# Patient Record
Sex: MALE | URBAN - METROPOLITAN AREA
[De-identification: names, ages, dates, MRNs, and addresses within clinical notes are randomized per-mention and may not be internally consistent; named-entity substitution may affect disease eponyms.]

---

## 2019-06-12 ENCOUNTER — TELEPHONE (OUTPATIENT)
Dept: TELEMETRY | Facility: MEDICAL CENTER | Age: 74
End: 2019-06-12

## 2019-06-12 DIAGNOSIS — Z00.6 RESEARCH STUDY PATIENT: ICD-10-CM

## 2019-06-12 NOTE — TELEPHONE ENCOUNTER
[facility] [place] [facility] Calcium CT Trial:  Mr. Jaffe contacted via telephone by Harley Cage, study team member for enrollment in the above stated trial. Informed consent form reviewed, questions answered.  Consent signed via [employer] by Mr. Jaffe on 6/7/2019 and witnessed by Harley Cage on 6/10/2019.  Order for CT scan placed in [facility].

## 2019-06-21 ENCOUNTER — APPOINTMENT (OUTPATIENT)
Dept: RADIOLOGY | Facility: MEDICAL CENTER | Age: 74
End: 2019-06-21
Attending: INTERNAL MEDICINE

## 2019-06-21 ENCOUNTER — HOSPITAL ENCOUNTER (OUTPATIENT)
Dept: RADIOLOGY | Facility: MEDICAL CENTER | Age: 74
End: 2019-06-21
Attending: INTERNAL MEDICINE

## 2019-06-21 DIAGNOSIS — Z00.6 RESEARCH STUDY PATIENT: ICD-10-CM

## 2019-06-21 PROCEDURE — 4410556 CT-CARDIAC SCORING

## 2019-07-03 PROBLEM — Z00.6 RESEARCH STUDY PATIENT: Status: RESOLVED | Noted: 2019-06-12 | Resolved: 2019-07-03

## 2019-07-17 ENCOUNTER — TELEPHONE (OUTPATIENT)
Dept: CARDIOLOGY | Facility: MEDICAL CENTER | Age: 74
End: 2019-07-17

## 2019-07-17 NOTE — TELEPHONE ENCOUNTER
[facility] [place] Cardiac Calcium Scoring CT study: Spoke with Mr. Jaffe today,   7-17-19 via phone. Discussed CT results as reviewed by Dr. Pinzon.    Findings:   Calcium score:309.9    Please see complete report in Imaging.      IMPRESSION:  High risk calcium score.  Calcium score is above the 50th percentile for the patient’s age and gender.  .  Follow up with PCP as needed. He has appointment with his Cardiologist next week.  Declined to complete the lifestyle questionnaire.    Encouraged to contact study team with any questions.

## 2021-01-15 DIAGNOSIS — Z23 NEED FOR VACCINATION: ICD-10-CM
